# Patient Record
Sex: FEMALE | Race: WHITE | NOT HISPANIC OR LATINO | URBAN - METROPOLITAN AREA
[De-identification: names, ages, dates, MRNs, and addresses within clinical notes are randomized per-mention and may not be internally consistent; named-entity substitution may affect disease eponyms.]

---
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
## 2022-07-14 NOTE — HISTORY OF PRESENT ILLNESS
[FreeTextEntry1] : DRE is a 21 year female who presents for a second opinion and cardiac evaluation in the setting of postural dizziness. DRE states that she she felt infrequent episodes of dizziness for 2-3 years but after getting COVID-19 in February her symptoms worsened. She states that whenever she stands up she gets lightheaded, black vision, feels out of it, tired, weak, and feels her heart beat rapidly. Overall, she feels like she is in a fog. She is planning on going to [facility] next year to do [place].\par \par DRE was seen by Dr. Dailey from Adult Cardiology who did started her on midodrine 2.5 mg TID for borderline orthostatic hypotension (by report, she never met official criteria). She has been taking it for 2 days and does not notice any difference. She has been self monitoirng her BP at home and states that her SBP is in the 80s to 90s while sitting down and slightly increases with standing.  \par DRE drinks minimal water during the day, has not been able to exercise due to feeling dizzy, and has lost 15-20 lbs since Feb because she lost her appetite after kassandra COVID.\par \par There is no family history of first degree relatives with congenital heart disease, sudden cardiac death or arrhythmia. \par  No

## 2022-09-20 ENCOUNTER — APPOINTMENT (OUTPATIENT)
Dept: PEDIATRIC CARDIOLOGY | Facility: CLINIC | Age: 22
End: 2022-09-20

## 2022-09-20 ENCOUNTER — OUTPATIENT (OUTPATIENT)
Dept: OUTPATIENT SERVICES | Age: 22
LOS: 1 days | Discharge: HOME | End: 2022-09-20

## 2022-09-20 VITALS
OXYGEN SATURATION: 100 % | WEIGHT: 120.48 LBS | SYSTOLIC BLOOD PRESSURE: 107 MMHG | BODY MASS INDEX: 21.35 KG/M2 | DIASTOLIC BLOOD PRESSURE: 67 MMHG | HEART RATE: 79 BPM | RESPIRATION RATE: 18 BRPM | HEIGHT: 62.99 IN

## 2022-09-20 DIAGNOSIS — I49.8 OTHER SPECIFIED CARDIAC ARRHYTHMIAS: ICD-10-CM

## 2022-09-20 DIAGNOSIS — R00.8 OTHER ABNORMALITIES OF HEART BEAT: ICD-10-CM

## 2022-09-20 PROCEDURE — 99215 OFFICE O/P EST HI 40 MIN: CPT | Mod: 25

## 2022-09-20 PROCEDURE — 93000 ELECTROCARDIOGRAM COMPLETE: CPT

## 2022-09-20 RX ORDER — MIDODRINE HYDROCHLORIDE 2.5 MG/1
2.5 TABLET ORAL 3 TIMES DAILY
Refills: 0 | Status: DISCONTINUED | COMMUNITY
End: 2022-09-20

## 2022-09-20 NOTE — REASON FOR VISIT
[Follow-Up] : a follow-up visit for [Dizziness/Lightheadedness] : dizziness/lightheadedness [Syncope] : syncope [Patient] : patient [Medical Records] : medical records

## 2022-09-21 PROBLEM — I49.8 POTS (POSTURAL ORTHOSTATIC TACHYCARDIA SYNDROME): Status: ACTIVE | Noted: 2022-09-21

## 2022-09-21 PROBLEM — R00.8 SYMPTOMATIC ORTHOSTATIC INCREASE IN HEART RATE: Status: ACTIVE | Noted: 2021-08-20

## 2022-09-21 NOTE — CLINICAL NARRATIVE
[Up to Date] : Up to Date [FreeTextEntry2] : DRE MARTINEZ is a  22 year who  arrives for follow up for syncope/ dizziness with no improved symptoms, drinks 8 cups of water per day.

## 2022-09-21 NOTE — CARDIOLOGY SUMMARY
[Today's Date] : [unfilled] [FreeTextEntry1] : Normal sinus rhythm. Normal axis and intervals without chamber enlargement or hypertrophy. Right ventricular conduction delay. HR (bpm): 79 [de-identified] : orthostatics  [de-identified] : lying 107/67 hr 79\par standing 112HR, pt dizzy could not complete vitals standing

## 2022-09-21 NOTE — HISTORY OF PRESENT ILLNESS
[FreeTextEntry1] : DRE is a 22 year female who presents for follow-up of POTS and orthostatic intolerance. DRE has been living in [place] for the past year and is overall functioning well. She is planning on starting [employer] this fall.\par DRE states that her symptoms slightly improved over the winter but worsened as the weather got hotter. She has increased her water intake but stopped taking the salt pills. At peak she was taking 2 pills of Vitassium twice a day (mixing it in a drink) but had trouble keeping up with it. She has difficulty swallowing pills.\par DRE states that she often feels fatigued and sits down and falls asleep regularly.\par She denies palpitations.

## 2022-09-21 NOTE — CONSULT LETTER
[Today's Date] : [unfilled] [Name] : Name: [unfilled] [] : : ~~ [Today's Date:] : [unfilled] [Dear  ___:] : Dear Dr. [unfilled]: [Consult] : I had the pleasure of evaluating your patient, [unfilled]. My full evaluation follows. [Consult - Single Provider] : Thank you very much for allowing me to participate in the care of this patient. If you have any questions, please do not hesitate to contact me. [Sincerely,] : Sincerely, [FreeTextEntry4] : DR. LOPEZ ARZATE MD [de-identified] : Adelso Lee MD, FAAP, FACC\par \par Pediatric Cardiologist\par  of Pediatrics\par [facility]

## 2022-09-21 NOTE — DISCUSSION/SUMMARY
[FreeTextEntry1] : DRE has POTS with a normal cardiac exam and electrocardiogram.  She has persistent orthostatic tachycardia in the office today and has not been taking her salt supplements or optimizing her fluid intake. \par  I reassured DRE that DRE's heart is structurally and functionally normal.  I emphasized the importance of increasing her fluid intake with the goal of producing dilute urine as well as increasing her salt intake in the form of buffered salt tablets (up to 2 TABS three times per day) in the hope of improving her symptoms. Lifestyle changes including consistent sleep patterns, meals, and daily exercise (PT)was emphasized. Caffeine should be avoided due to its diuretic effect. Additionally, we discussed counterpressure techniques and the use of compression shorts/ leggings to help with her symptoms.\par I again explained the importance of consistent behaviors in order to help with her symptoms especially in [place] where it is very hot for many moths of the year. \par All physical activities may be performed without restriction and there is no need for routine follow-up unless symptoms persist despite the above measures or if future concerns arise.\par   [Needs SBE Prophylaxis] : [unfilled] does not need bacterial endocarditis prophylaxis [PE + No Restrictions] : [unfilled] may participate in the entire physical education program without restriction, including all varsity competitive sports.

## 2024-03-03 ENCOUNTER — NON-APPOINTMENT (OUTPATIENT)
Age: 24
End: 2024-03-03

## 2024-03-04 ENCOUNTER — NON-APPOINTMENT (OUTPATIENT)
Age: 24
End: 2024-03-04

## 2024-03-05 ENCOUNTER — APPOINTMENT (OUTPATIENT)
Dept: MRI IMAGING | Facility: CLINIC | Age: 24
End: 2024-03-05
Payer: COMMERCIAL

## 2024-03-05 ENCOUNTER — APPOINTMENT (OUTPATIENT)
Dept: ORTHOPEDIC SURGERY | Facility: CLINIC | Age: 24
End: 2024-03-05
Payer: COMMERCIAL

## 2024-03-05 VITALS — WEIGHT: 120 LBS | HEIGHT: 62 IN | BODY MASS INDEX: 22.08 KG/M2

## 2024-03-05 DIAGNOSIS — S83.412A SPRAIN OF MEDIAL COLLATERAL LIGAMENT OF LEFT KNEE, INITIAL ENCOUNTER: ICD-10-CM

## 2024-03-05 PROCEDURE — 99203 OFFICE O/P NEW LOW 30 MIN: CPT

## 2024-03-05 PROCEDURE — 73721 MRI JNT OF LWR EXTRE W/O DYE: CPT

## 2024-03-07 ENCOUNTER — APPOINTMENT (OUTPATIENT)
Dept: ORTHOPEDIC SURGERY | Facility: CLINIC | Age: 24
End: 2024-03-07
Payer: COMMERCIAL

## 2024-03-07 VITALS — HEIGHT: 62 IN | WEIGHT: 120 LBS | BODY MASS INDEX: 22.08 KG/M2

## 2024-03-07 DIAGNOSIS — S83.412D SPRAIN OF MEDIAL COLLATERAL LIGAMENT OF LEFT KNEE, SUBSEQUENT ENCOUNTER: ICD-10-CM

## 2024-03-07 PROCEDURE — 99213 OFFICE O/P EST LOW 20 MIN: CPT

## 2024-03-07 NOTE — HISTORY OF PRESENT ILLNESS
[Sudden] : sudden [7] : 7 [3] : 3 [Sharp] : sharp [Throbbing] : throbbing [Constant] : constant [Rest] : rest [Meds] : meds [Standing] : standing [Walking] : walking [de-identified] : [place] and twisted the left knee as she fell, she in [place] and had MRI and feels the same [] : no [FreeTextEntry1] : LT knee  [FreeTextEntry5] : Patient was skiing yesterday and injured her LT knee. Patient went to  yesterday and today, patient on crutches and wearing brace.  [FreeTextEntry9] : motrin  [de-identified] : [facility] [de-identified] : 3/4/24 [de-identified] : turning

## 2024-03-07 NOTE — DATA REVIEWED
[MRI] : MRI [Left] : left [Report was reviewed and noted in the chart] : The report was reviewed and noted in the chart [Knee] : knee [FreeTextEntry1] : MCL sprain, MPFL sprain, mild ACL sprain, patellofemoral synovitis

## 2024-03-07 NOTE — PHYSICAL EXAM
[Left] : left knee [5___] : quadriceps 5[unfilled]/5 [Equivocal] : equivocal Nikki [] : no effusion [TWNoteComboBox7] : flexion 100 degrees

## 2024-03-07 NOTE — DISCUSSION/SUMMARY
[de-identified] : modify activities recommend hinged knee brace try OTC meds ice as needed try topical lidocaine reviewed current medications used by this patient 03/07/2024    RE:  DRE [facility]ARTINEZ   [id number]    Attention:  Nurse Reviewer /Medical Director  I am writing this letter as a medical necessity for PT program. Patient has tried analgesics, non-steroid anti-inflammatory agents,  hot or cold compresses,injections of corticosteroids, etc)  which in combination or by themselves has not worked. Based on my patient's condition, I strongly believe that the PT is medically needed.   Thank you for your time and consideration.    she lives in [place]

## 2025-05-19 NOTE — ED PROVIDER NOTE - PROGRESS NOTE DETAILS
I was present during the key portion of the procedure. after treatments pt is now with no resp distress, wheeze has resolved, will continue to observe, Spenser Hernandez MD Reports breathing improved.  CTA b/l with good air entry at bases, no wheezing. Will do MDI with spacer for teaching and to continue at home every4 hours and follow up with PMD.  Take one more dose of decadron Monday morning.  Urinalysis negative, accucheck WNL. -Tiara Rooney, PEM Fellow